# Patient Record
Sex: MALE | Race: OTHER | Employment: FULL TIME | URBAN - METROPOLITAN AREA
[De-identification: names, ages, dates, MRNs, and addresses within clinical notes are randomized per-mention and may not be internally consistent; named-entity substitution may affect disease eponyms.]

---

## 2024-09-26 ENCOUNTER — HOSPITAL ENCOUNTER (EMERGENCY)
Age: 19
Discharge: HOME | End: 2024-09-26

## 2024-09-26 VITALS
HEIGHT: 63 IN | SYSTOLIC BLOOD PRESSURE: 154 MMHG | TEMPERATURE: 98.7 F | HEART RATE: 86 BPM | WEIGHT: 125.66 LBS | RESPIRATION RATE: 16 BRPM | OXYGEN SATURATION: 99 % | DIASTOLIC BLOOD PRESSURE: 91 MMHG | BODY MASS INDEX: 22.27 KG/M2

## 2024-09-26 DIAGNOSIS — H10.31 ACUTE CONJUNCTIVITIS OF RIGHT EYE, UNSPECIFIED ACUTE CONJUNCTIVITIS TYPE: Primary | ICD-10-CM

## 2024-09-26 PROCEDURE — 99283 EMERGENCY DEPT VISIT LOW MDM: CPT

## 2024-09-26 PROCEDURE — 6370000000 HC RX 637 (ALT 250 FOR IP)

## 2024-09-26 RX ORDER — TETRACAINE HYDROCHLORIDE 5 MG/ML
1 SOLUTION OPHTHALMIC ONCE
Status: COMPLETED | OUTPATIENT
Start: 2024-09-26 | End: 2024-09-26

## 2024-09-26 RX ORDER — ERYTHROMYCIN 5 MG/G
OINTMENT OPHTHALMIC ONCE
Status: COMPLETED | OUTPATIENT
Start: 2024-09-26 | End: 2024-09-26

## 2024-09-26 RX ORDER — ERYTHROMYCIN 5 MG/G
OINTMENT OPHTHALMIC
Qty: 3.5 G | Refills: 0 | Status: SHIPPED | OUTPATIENT
Start: 2024-09-26 | End: 2024-10-06

## 2024-09-26 RX ADMIN — TETRACAINE HYDROCHLORIDE 1 DROP: 5 SOLUTION OPHTHALMIC at 20:39

## 2024-09-26 RX ADMIN — FLUORESCEIN SODIUM 1 MG: 1 STRIP OPHTHALMIC at 20:39

## 2024-09-26 RX ADMIN — ERYTHROMYCIN: 5 OINTMENT OPHTHALMIC at 20:38

## 2024-09-26 ASSESSMENT — LIFESTYLE VARIABLES: HOW OFTEN DO YOU HAVE A DRINK CONTAINING ALCOHOL: NEVER

## 2024-09-26 ASSESSMENT — PAIN - FUNCTIONAL ASSESSMENT: PAIN_FUNCTIONAL_ASSESSMENT: NONE - DENIES PAIN

## 2024-09-26 ASSESSMENT — VISUAL ACUITY
OD: 20/40
OU: 20/40
OS: 20/63

## 2024-09-27 NOTE — DISCHARGE INSTRUCTIONS
Please apply antibiotic limb as prescribed.  If you develop any new or worsening symptoms, return to the emergency department for reevaluation.  Please read attached discharge instructions on conjunctivitis.

## 2024-09-27 NOTE — ED PROVIDER NOTES
Independent S[facility] Visit.      [facility]  Department of Emergency Medicine   ED  Encounter Note  Admit Date/RoomTime: 2024  7:36 PM  ED Room: [place] [address]    NAME: Helder Robledo  : 2005  MRN: [id number]     Chief Complaint:  Conjunctivitis (Right eye redness with green drainage that started today. )    History of Present Illness        Helder Robledo is a 19 y.o. old male presenting to the emergency department by private vehicle, for non-traumatic gradual onset discharge, erythema, foreign body sensation, and tearing to right eye, which began 1 day(s) prior to arrival.  There has been no obvious mechanism causing complaint.  Since onset his symptoms have been remaining constant and moderate in severity. Associated signs & symptoms of: states vision is occasionally blurry but when he wipes away discharge he can see fine. Denies any fevers, headaches, chills, bodies, lightheadedness, dizziness, weakness, chest pain, shortness of breath, abdominal pain.    History of:     []   Glaucoma     []   Recent Eye Surgery     ROS   Pertinent positives and negatives are stated within HPI, all other systems reviewed and are negative.    Past Medical History:  has no past medical history on file.    Surgical History:  has no past surgical history on file.    Social History:      Family History: family history is not on file.     Allergies: Pcn [penicillins]    Physical Exam   Oxygen Saturation Interpretation: Normal.        ED Triage Vitals   BP Systolic BP Percentile Diastolic BP Percentile Temp Temp Source Pulse Respirations SpO2   24 -- -- 24   (!) 154/91   98.7 °F (37.1 °C) Temporal 86 16 99 %      Height Weight - Scale         24         1.6 m (5' 2.99\") 57 kg (125 lb 10.6 oz)             Physical Exam  Constitutional:  Alert, development consistent with age.  HENT:  NC/NT.  Airway patent.  Neck: